# Patient Record
Sex: FEMALE | Race: WHITE | NOT HISPANIC OR LATINO | URBAN - METROPOLITAN AREA
[De-identification: names, ages, dates, MRNs, and addresses within clinical notes are randomized per-mention and may not be internally consistent; named-entity substitution may affect disease eponyms.]

---

## 2017-02-02 ENCOUNTER — COMMUNICATION - HEALTHEAST (OUTPATIENT)
Dept: HEALTH INFORMATION MANAGEMENT | Facility: CLINIC | Age: 37
End: 2017-02-02

## 2017-08-30 ENCOUNTER — AMBULATORY - HEALTHEAST (OUTPATIENT)
Dept: FAMILY MEDICINE | Facility: CLINIC | Age: 37
End: 2017-08-30

## 2017-08-30 ENCOUNTER — COMMUNICATION - HEALTHEAST (OUTPATIENT)
Dept: FAMILY MEDICINE | Facility: CLINIC | Age: 37
End: 2017-08-30

## 2017-08-30 DIAGNOSIS — J45.30 MILD PERSISTENT ASTHMA WITHOUT COMPLICATION: ICD-10-CM

## 2017-08-30 DIAGNOSIS — H00.011 HORDEOLUM EXTERNUM OF RIGHT UPPER EYELID: ICD-10-CM

## 2017-09-11 ENCOUNTER — RECORDS - HEALTHEAST (OUTPATIENT)
Dept: ADMINISTRATIVE | Facility: OTHER | Age: 37
End: 2017-09-11

## 2018-05-17 ENCOUNTER — OFFICE VISIT - HEALTHEAST (OUTPATIENT)
Dept: FAMILY MEDICINE | Facility: CLINIC | Age: 38
End: 2018-05-17

## 2018-05-17 DIAGNOSIS — Z00.00 ROUTINE GENERAL MEDICAL EXAMINATION AT A HEALTH CARE FACILITY: ICD-10-CM

## 2018-05-17 DIAGNOSIS — J45.40 MODERATE PERSISTENT ASTHMA WITHOUT COMPLICATION: ICD-10-CM

## 2018-05-17 DIAGNOSIS — Z30.431 IUD CHECK UP: ICD-10-CM

## 2018-05-17 ASSESSMENT — MIFFLIN-ST. JEOR: SCORE: 1545.4

## 2019-08-01 ENCOUNTER — OFFICE VISIT - HEALTHEAST (OUTPATIENT)
Dept: FAMILY MEDICINE | Facility: CLINIC | Age: 39
End: 2019-08-01

## 2019-08-01 DIAGNOSIS — J45.40 MODERATE PERSISTENT ASTHMA WITHOUT COMPLICATION: ICD-10-CM

## 2019-08-01 DIAGNOSIS — R53.83 FATIGUE, UNSPECIFIED TYPE: ICD-10-CM

## 2019-08-01 DIAGNOSIS — Z00.00 ROUTINE GENERAL MEDICAL EXAMINATION AT A HEALTH CARE FACILITY: ICD-10-CM

## 2019-08-01 DIAGNOSIS — Z13.220 LIPID SCREENING: ICD-10-CM

## 2019-08-01 LAB
ALBUMIN SERPL-MCNC: 4.3 G/DL (ref 3.5–5)
ALP SERPL-CCNC: 93 U/L (ref 45–120)
ALT SERPL W P-5'-P-CCNC: 33 U/L (ref 0–45)
ANION GAP SERPL CALCULATED.3IONS-SCNC: 12 MMOL/L (ref 5–18)
AST SERPL W P-5'-P-CCNC: 27 U/L (ref 0–40)
BASOPHILS # BLD AUTO: 0.1 THOU/UL (ref 0–0.2)
BASOPHILS NFR BLD AUTO: 1 % (ref 0–2)
BILIRUB SERPL-MCNC: 0.8 MG/DL (ref 0–1)
BUN SERPL-MCNC: 10 MG/DL (ref 8–22)
C REACTIVE PROTEIN LHE: 0.8 MG/DL (ref 0–0.8)
CALCIUM SERPL-MCNC: 9.9 MG/DL (ref 8.5–10.5)
CHLORIDE BLD-SCNC: 104 MMOL/L (ref 98–107)
CHOLEST SERPL-MCNC: 205 MG/DL
CO2 SERPL-SCNC: 25 MMOL/L (ref 22–31)
CREAT SERPL-MCNC: 0.76 MG/DL (ref 0.6–1.1)
EOSINOPHIL # BLD AUTO: 0.2 THOU/UL (ref 0–0.4)
EOSINOPHIL NFR BLD AUTO: 2 % (ref 0–6)
ERYTHROCYTE [DISTWIDTH] IN BLOOD BY AUTOMATED COUNT: 11.2 % (ref 11–14.5)
FASTING STATUS PATIENT QL REPORTED: NO
FERRITIN SERPL-MCNC: 49 NG/ML (ref 10–130)
GFR SERPL CREATININE-BSD FRML MDRD: >60 ML/MIN/1.73M2
GLUCOSE BLD-MCNC: 85 MG/DL (ref 70–125)
HCT VFR BLD AUTO: 45.3 % (ref 35–47)
HDLC SERPL-MCNC: 74 MG/DL
HGB BLD-MCNC: 15.1 G/DL (ref 12–16)
LDLC SERPL CALC-MCNC: 118 MG/DL
LYMPHOCYTES # BLD AUTO: 2 THOU/UL (ref 0.8–4.4)
LYMPHOCYTES NFR BLD AUTO: 28 % (ref 20–40)
MCH RBC QN AUTO: 31 PG (ref 27–34)
MCHC RBC AUTO-ENTMCNC: 33.3 G/DL (ref 32–36)
MCV RBC AUTO: 93 FL (ref 80–100)
MONOCYTES # BLD AUTO: 0.4 THOU/UL (ref 0–0.9)
MONOCYTES NFR BLD AUTO: 5 % (ref 2–10)
NEUTROPHILS # BLD AUTO: 4.5 THOU/UL (ref 2–7.7)
NEUTROPHILS NFR BLD AUTO: 64 % (ref 50–70)
PLATELET # BLD AUTO: 362 THOU/UL (ref 140–440)
PMV BLD AUTO: 6.9 FL (ref 7–10)
POTASSIUM BLD-SCNC: 4.5 MMOL/L (ref 3.5–5)
PROT SERPL-MCNC: 7.7 G/DL (ref 6–8)
RBC # BLD AUTO: 4.87 MILL/UL (ref 3.8–5.4)
SODIUM SERPL-SCNC: 141 MMOL/L (ref 136–145)
TRIGL SERPL-MCNC: 65 MG/DL
WBC: 7 THOU/UL (ref 4–11)

## 2019-08-01 ASSESSMENT — MIFFLIN-ST. JEOR: SCORE: 1560.03

## 2019-08-02 LAB
25(OH)D3 SERPL-MCNC: 31.1 NG/ML (ref 30–80)
25(OH)D3 SERPL-MCNC: 31.1 NG/ML (ref 30–80)
HPV SOURCE: NORMAL
HUMAN PAPILLOMA VIRUS 16 DNA: NEGATIVE
HUMAN PAPILLOMA VIRUS 18 DNA: NEGATIVE
HUMAN PAPILLOMA VIRUS FINAL DIAGNOSIS: NORMAL
HUMAN PAPILLOMA VIRUS OTHER HR: NEGATIVE
SPECIMEN DESCRIPTION: NORMAL

## 2019-10-08 ENCOUNTER — COMMUNICATION - HEALTHEAST (OUTPATIENT)
Dept: FAMILY MEDICINE | Facility: CLINIC | Age: 39
End: 2019-10-08

## 2021-01-29 ENCOUNTER — OFFICE VISIT - HEALTHEAST (OUTPATIENT)
Dept: FAMILY MEDICINE | Facility: CLINIC | Age: 41
End: 2021-01-29

## 2021-01-29 DIAGNOSIS — Z13.220 LIPID SCREENING: ICD-10-CM

## 2021-01-29 DIAGNOSIS — Z30.432 ENCOUNTER FOR IUD REMOVAL: ICD-10-CM

## 2021-01-29 DIAGNOSIS — R63.5 ABNORMAL WEIGHT GAIN: ICD-10-CM

## 2021-01-29 DIAGNOSIS — Z13.1 SCREENING FOR DIABETES MELLITUS: ICD-10-CM

## 2021-01-29 DIAGNOSIS — J45.40 MODERATE PERSISTENT ASTHMA WITHOUT COMPLICATION: ICD-10-CM

## 2021-01-29 DIAGNOSIS — Z00.00 ROUTINE GENERAL MEDICAL EXAMINATION AT A HEALTH CARE FACILITY: ICD-10-CM

## 2021-01-29 LAB
CHOLEST SERPL-MCNC: 201 MG/DL
FASTING STATUS PATIENT QL REPORTED: YES
FASTING STATUS PATIENT QL REPORTED: YES
GLUCOSE BLD-MCNC: 89 MG/DL (ref 70–99)
HDLC SERPL-MCNC: 76 MG/DL
LDLC SERPL CALC-MCNC: 109 MG/DL
TRIGL SERPL-MCNC: 79 MG/DL
TSH SERPL DL<=0.005 MIU/L-ACNC: 1.02 UIU/ML (ref 0.3–5)

## 2021-01-29 RX ORDER — ALBUTEROL SULFATE 90 UG/1
2 AEROSOL, METERED RESPIRATORY (INHALATION) EVERY 6 HOURS PRN
Qty: 8.5 G | Refills: 6 | Status: SHIPPED | OUTPATIENT
Start: 2021-01-29

## 2021-01-29 ASSESSMENT — MIFFLIN-ST. JEOR: SCORE: 1670.88

## 2021-05-28 ASSESSMENT — ASTHMA QUESTIONNAIRES: ACT_TOTALSCORE: 21

## 2021-05-31 NOTE — PROGRESS NOTES
"FEMALE ADULT PREVENTIVE EXAM    CHIEF COMPLAINT:  Female preventive exam.    SUBJECTIVE:  Yoly Casas is a 39 y.o. female who presents for her routine physical exam.    Patient would like to address the following concerns today:     1) Stress:  Job is stressful.  [employer] is restructuring.  Her  almost left the family for a coworker who was 17 years younger.  They have had to cut off ties with her 's mother as mother was exhibiting abusive behavior to their children telling them \" Your mother does not love you like I do.\"  Her  went through a deep depression and at one point he was suicidal.  He has now found a good counselor and working on medication.  Patient herself feels like she is handling things well.  Offered counseling more for support for her, since her  is currently unavailable to be in the supportive role.  Number given for [facility].    2) Asthma:  Not using the fluticasone really.  Using albuterol just once a month.  Cold air is her trigger, so she takes it in the winter.      3) Going to a naturopath for fatigue.  Has labs she would like drawn today.  Sleep is disturbed.  No problems falling asleep.  Wakes at 2-3 am and up for an hour.  Sometimes the cats wake her up.  She is just laying in the dark.  Tries to meditate.  Taking magnesium glycinate 400 mg a day.  Not really interested in medication.  Discussed how stress can cause fatigue, but also disturb sleep.    GYNE HISTORY  Menses: yes, monthly light for 1 week  Sexually Active: with   Contraception: 11/2016 - Mirena IUD  Last Pap: 2014 - normal and neg HPV  Abnormal Pap: none  Vaginal Discharge: none      She  has a past medical history of Allergic, Asthma, and Urinary tract infection.    Lab Results   Component Value Date    WBC 9.0 10/13/2016    HGB 14.3 10/13/2016    HCT 42.2 10/13/2016    MCV 89 10/13/2016     10/13/2016     11/14/2014    K 4.9 11/14/2014    BUN 9 11/14/2014 "     Lab Results   Component Value Date    CHOL 191 10/13/2016    HDL 63 10/13/2016    LDLCALC 110 10/13/2016    TRIG 88 10/13/2016     Lab Results   Component Value Date    TSH 1.16 10/13/2016     BP Readings from Last 3 Encounters:   08/01/19 120/80   05/17/18 112/70   11/09/16 108/80       Surgeries:  No past surgical history on file.    Family History:  Her family history includes Diabetes in her maternal grandfather and maternal grandmother; Heart disease in her maternal grandfather; Hypothyroidism in her maternal grandmother; No Medical Problems in her brother, brother, father, mother, sister, and son.    Social History:  She  reports that she has never smoked. She has never used smokeless tobacco. She reports that she does not drink alcohol or use drugs.    Medications:    Current Outpatient Medications:      albuterol (PROAIR HFA) 90 mcg/actuation inhaler, Inhale 2 puffs every 6 (six) hours as needed for wheezing., Disp: 8.5 g, Rfl: 6     fluticasone propionate (FLOVENT HFA) 110 mcg/actuation inhaler, Inhale 1 puff 2 (two) times a day., Disp: 3 Inhaler, Rfl: 1     levonorgestrel (MIRENA) 20 mcg/24 hours (5 yrs) 52 mg IUD, 1 each by Intrauterine route once., Disp: , Rfl:       Allergies:  No latex allergies.  No Known Allergies       RISK BEHAVIOR & HEALTH HABITS  Self Breast Exam: yes.  Regular Exercise: weightlifting, HIIT combo 4 days a week.  One day of yoga.  Balanced diet: yes, intermittent fasting - 8 hours of eating window.  Seat Belt Use: yes  Calcium intake/Osteoporosis prevention: Discussed Vitamin D.      Guns: NO  Sun Screen: YES  Dental Care: YES    REVIEW OF SYSTEMS:  Complete head to toe review of systems is otherwise negative except as above.    OBJECTIVE:  VITAL SIGNS:    Vitals:    08/01/19 1327   BP: 120/80   Pulse: 63   Resp: 16   Temp: 98.3  F (36.8  C)   SpO2: 98%     GENERAL:  Patient alert, in no acute distress.  EYES: PERRLA. Extraoccular movements intact, pupils equal, reactive to  light and accommodation.  Normal conjunctiva and lids.  Undilated fudoscopic exam normal, including normal size, appearance cup-to-disc ratio.  Normal posterior segments, including no exudates or hemorrhages.  ENT:  Hearing grossly normal.  Normal appearance to ears and nose.  Bilateral TM s, external canals, oropharynx normal. Normal lips, gums and teeth.  Normal nasal mucosa, septum and turbinates.  NECK:  Supple, without thyromegaly or mass.  RESP:  Clear to auscultation without crackles, wheezes or distress.  Normal respiratory effort.   CV:  Regular rate and rhythm without murmurs, rubs or gallops.  Normal carotid, abdominal aorta, femoral and pedal pulses.  No varicosities or edema.  ABDOMEN:  Soft, non-tender, without hepatosplenomegaly, masses, or hernias.   BREASTS:  Nontender, without masses, nipple discharge, erythema, or axillary adenopathy.    :    External genitalia:  Normal without lesions.  Urethra: Normal appearing  Vagina: Normal without discharge  Cervix: normal.  IUD string in place.  Uterus:  Nontender, mobile, without masses  Ovaries: Normal without masses  LYMPHATIC: Normal palpation of neck, groin and axilla..  No lymphadenopathy.  No bruising.  NEURO:  CN II-XII intact, motor & sensory function all intact.  DTR and reflexes normal.  PSYCHIATRIC:  Alert & oriented with normal mood and affect.  Good judgment and insight.  SKIN:  Normal inspection and palpation.  MUSCULOSKELETAL: Normal gait and station.  - Spine / Ribs / Pelvis: Normal inspection, ROM, stability and strength: Spine, Head, Neck, Upper and Lower Extremities.    ASSESSMENT & PLAN  Yoly was seen today for annual exam.    Diagnoses and all orders for this visit:    Routine general medical examination at a health care facility  -     Gynecologic Cytology (PAP Smear)  Travelled to [facility] and went to a travel clinic for vaccinations 2011.  She is quite sure that she received the hepatitis A and B series.  Reviewed family history  for high risk screening.    Moderate persistent asthma without complication  -     albuterol (PROAIR HFA) 90 mcg/actuation inhaler; Inhale 2 puffs every 6 (six) hours as needed for wheezing.  -     fluticasone propionate (FLOVENT HFA) 110 mcg/actuation inhaler; Inhale 1 puff 2 (two) times a day.  Well controlled.  Does not use the fluticasone except in the winter as cold air is her trigger.    Fatigue, unspecified type  -     HM1(CBC and Differential)  -     Comprehensive Metabolic Panel  -     C-Reactive Protein  -     Ferritin  -     HM1 (CBC with Diff)  -     Vitamin D, Total (25-Hydroxy)  Will check for organic causes, but her sleep and excessive amounts of stress can both contribute to this.  Recommend increased Vitamin D supplement to 3,000 - 4,000 daily.  Discussed melatonin 3 mg at bedtime.  Recommend lavendar essential oil.  Discussed the possibility of trazodone for night time, but patient declines for now.    Lipid screening  -     Lipid [place] RANDOM

## 2021-06-01 VITALS — BODY MASS INDEX: 32.36 KG/M2 | HEIGHT: 65 IN | WEIGHT: 194.25 LBS

## 2021-06-03 VITALS — HEIGHT: 65 IN | BODY MASS INDEX: 32.99 KG/M2 | WEIGHT: 198 LBS

## 2021-06-05 VITALS
OXYGEN SATURATION: 98 % | DIASTOLIC BLOOD PRESSURE: 90 MMHG | HEIGHT: 65 IN | BODY MASS INDEX: 36.91 KG/M2 | TEMPERATURE: 98.4 F | HEART RATE: 85 BPM | SYSTOLIC BLOOD PRESSURE: 120 MMHG | WEIGHT: 221.56 LBS

## 2021-06-14 NOTE — PROGRESS NOTES
FEMALE ADULT PREVENTIVE EXAM    CHIEF COMPLAINT:  Female preventive exam.    SUBJECTIVE:  Yoly Casas is a 40 y.o. female who presents for her routine physical exam.    Patient would like to address the following concerns today:     Working from home.  Work is stressful due to changes.  Marriage dynamic has improved since cutting ties with family.  Moved into a bigger house and has more space.  Dilshad was furloughed for four months, but cutting hours.  Large yard.  Kids are in school  Georgette is in .  Rosario was remote, but went back full time 2 weeks ago.        Would like IUD removed:  Difficulties with losing weight.  Having break through spotting anyway.  Looking at effects on mood.    Working out 5 days a week.  Restrictive diet x 1.5 years.      GYNE HISTORY  Menses: spotting for 3-4 days every 3-4 weeks.  Getting cramps again.    Sexually Active: with .  Contraception: Mirena  Last Pap: 2019  Abnormal Pap: no  Vaginal Discharge: little bit.      She  has a past medical history of Allergic, Asthma, and Urinary tract infection.    Lab Results   Component Value Date    WBC 7.0 08/01/2019    HGB 15.1 08/01/2019    HCT 45.3 08/01/2019    MCV 93 08/01/2019     08/01/2019     08/01/2019    K 4.5 08/01/2019    BUN 10 08/01/2019     Lab Results   Component Value Date    CHOL 201 (H) 01/29/2021    HDL 76 01/29/2021    LDLCALC 109 01/29/2021    TRIG 79 01/29/2021     Lab Results   Component Value Date    TSH 1.02 01/29/2021     BP Readings from Last 3 Encounters:   01/29/21 120/90   08/01/19 120/80   05/17/18 112/70       Surgeries:  No past surgical history on file.    Family History:  Her family history includes Diabetes in her maternal grandfather and maternal grandmother; Heart disease in her maternal grandfather; Hypothyroidism in her maternal grandmother; No Medical Problems in her brother, brother, father, mother, sister, and son.    Social History:  She  reports that she has never  smoked. She has never used smokeless tobacco. She reports that she does not drink alcohol or use drugs.    Medications:    Current Outpatient Medications:      albuterol (PROAIR HFA) 90 mcg/actuation inhaler, Inhale 2 puffs every 6 (six) hours as needed for wheezing., Disp: 8.5 g, Rfl: 6     fluticasone propionate (FLOVENT HFA) 110 mcg/actuation inhaler, Inhale 1 puff 2 (two) times a day., Disp: 3 Inhaler, Rfl: 1     levonorgestrel (MIRENA) 20 mcg/24 hours (5 yrs) 52 mg IUD, 1 each by Intrauterine route once., Disp: , Rfl:   HELD MEDICATIONS: None.    Allergies:  No latex allergies.  No Known Allergies       RISK BEHAVIOR & HEALTH HABITS  Self Breast Exam: yes  Regular Exercise: yoga, working out 5 days a week.  X 1.5 years.    Balanced diet: [place].  9128-3280 calories.   2 months no changes.    Seat Belt Use: yes  Calcium intake/Osteoporosis prevention: yes with MVI  Dexa: not due  Colonoscopy: not due  Mammogram: not due    Guns: NO  Sun Screen: YES  Dental Care: YES    REVIEW OF SYSTEMS:  Complete head to toe review of systems is otherwise negative except as above.    OBJECTIVE:  VITAL SIGNS:    Vitals:    01/29/21 0830   BP: 120/90   Pulse: 85   Temp: 98.4  F (36.9  C)   SpO2: 98%     GENERAL:  Patient alert, in no acute distress.  EYES: PERRLA. Extraoccular movements intact, pupils equal, reactive to light and accommodation.  Normal conjunctiva and lids.  Undilated fudoscopic exam normal, including normal size, appearance cup-to-disc ratio.  Normal posterior segments, including no exudates or hemorrhages.  ENT:  Hearing grossly normal.  Normal appearance to ears and nose.  Bilateral TM s, external canals, oropharynx normal. Normal lips, gums and teeth.  Normal nasal mucosa, septum and turbinates.  NECK:  Supple, without thyromegaly or mass.  RESP:  Clear to auscultation without crackles, wheezes or distress.  Normal respiratory effort.   CV:  Regular rate and rhythm without murmurs, rubs or gallops.  Normal  carotid, abdominal aorta, femoral and pedal pulses.  No varicosities or edema.  ABDOMEN:  Soft, non-tender, without hepatosplenomegaly, masses, or hernias.   BREASTS:  Nontender, without masses, nipple discharge, erythema, or axillary adenopathy.    :    External genitalia:  Normal without lesions.  Urethra: Normal appearing  Vagina: Normal without discharge  Cervix: normal  Uterus:  Nontender, mobile, without masses  Ovaries: Normal without masses  LYMPHATIC: Normal palpation of neck, groin and axilla..  No lymphadenopathy.  No bruising.  NEURO:  CN II-XII intact, motor & sensory function all intact.  DTR and reflexes normal.  PSYCHIATRIC:  Alert & oriented with normal mood and affect.  Good judgment and insight.  SKIN:  Normal inspection and palpation.  MUSCULOSKELETAL: Normal gait and station.  - Spine / Ribs / Pelvis: Normal inspection, ROM, stability and strength: Spine, Head, Neck, Upper and Lower Extremities.    ASSESSMENT & PLAN  Yoly was seen today for annual exam.    Diagnoses and all orders for this visit:    Routine general medical examination at a health care facility  Patient is leading a very healthy lifestyle  Reviewed family history for high risk screening.  Vaccines are up to date.    Abnormal weight gain  -     Thyroid [place]  Patient has had a lot of difficulty with inability to lose weight and she has been very healthy with her exercise and clean eating habits.  She is feeling stronger, but very frustrated by no change in weight or clothing fit.    Lipid screening  -     Lipid [place] FASTING    Screening for diabetes mellitus  -     Glucose    Moderate persistent asthma without complication  -     albuterol (PROAIR HFA) 90 mcg/actuation inhaler; Inhale 2 puffs every 6 (six) hours as needed for wheezing.  Well controlled.  In the past 4 weeks, how much of the time did your asthma keep you from getting as much done at work, school, or at home?: None of the time  During the past 4 weeks, how  often have you had shortness of breath?: Once or twice a week  During the past 4 weeks, how often did your asthma symptoms (wheezing, coughing, shortness of breath, chest tightness or pain) wake you up at night or earlier in the morning?: Not at all  During the past 4 weeks, how often have you used your rescue inhaler or nebulizer medication (such as albuterol)?: 2 or 3 times per week  How would you rate your asthma control during the past 4 weeks?: Well controlled  ACT Total Score: 21  In the past 12 months, have you visited the emergency room due to your asthma?: No  In the past 12 months, have you been hospitalized due to your asthma?: No      Encounter for IUD removal  IUD Removal:  Due to difficulties with weight gain    Discussed risks and benefits of procedure.  Patient gave verbal consent to proceed.  Patient was placed in the dorsolithotomy position.  Speculum was inserted and cervix was well visualized.  IUD string grasped with ring forceps and removed without complication.  Patient tolerated the procedure well.    Complications: none.  EBL: none.    Discussed ibuprofen for pain.  Discussed signs and symptoms for which to notify the clinic.

## 2021-06-18 NOTE — PROGRESS NOTES
FEMALE ADULT PREVENTIVE EXAM    CHIEF COMPLAINT:  Female preventive exam.    SUBJECTIVE:  Yoly Casas is a 37 y.o. female who presents for her routine physical exam.    Patient would like to address the following concerns today:     1) moderate persistent asthma:  Insurance did not cover asmanex.  Triggers: damp cold, cats, hot/humidity, ragweed, cold.  Has been out of asmanex for 4 weeks and taking inhaler weekly.  ACT today.  Has not had any other maintenance inhaler.  Asmanex 1 puff BID.  Will change to fluticasone HFA.  ACT = 22    2) Mirena Insertion: November.  Needs recheck today.  No discomfort, irregular spotting and bleeding.    GYNE HISTORY  Menses: intermittent spotting or full period   Sexually Active: with   Contraception: Mirena 11/2017  Last Pap: normal and neg HPV  2014  Abnormal Pap: none  Vaginal Discharge: no      She  has a past medical history of Allergic; Asthma; and Urinary tract infection.    Lab Results   Component Value Date    WBC 9.0 10/13/2016    HGB 14.3 10/13/2016    HCT 42.2 10/13/2016    MCV 89 10/13/2016     10/13/2016     11/14/2014    K 4.9 11/14/2014    BUN 9 11/14/2014     Lab Results   Component Value Date    CHOL 191 10/13/2016    HDL 63 10/13/2016    LDLCALC 110 10/13/2016    TRIG 88 10/13/2016     Lab Results   Component Value Date    TSH 1.16 10/13/2016     BP Readings from Last 3 Encounters:   05/17/18 112/70   11/09/16 108/80   10/13/16 110/82       Surgeries:  No past surgical history on file.    Family History:  Her family history includes Diabetes in her maternal grandfather and maternal grandmother; Heart disease in her maternal grandfather; Hypothyroidism in her maternal grandmother; No Medical Problems in her brother, brother, father, mother, sister, and son. There is no history of Breast cancer, Colon cancer, or Clotting disorder.    Social History:  She  reports that she has never smoked. She has never used smokeless tobacco. She reports  that she does not drink alcohol or use illicit drugs.    Medications:    Current Outpatient Prescriptions:      albuterol (PROAIR HFA) 90 mcg/actuation inhaler, Inhale 2 puffs every 6 (six) hours as needed for wheezing., Disp: 8.5 g, Rfl: 6     fluticasone (FLOVENT HFA) 110 mcg/actuation inhaler, Inhale 1 puff 2 (two) times a day., Disp: 3 Inhaler, Rfl: 1      Allergies:  No latex allergies.  No Known Allergies         RISK BEHAVIOR & HEALTH HABITS  Self Breast Exam: yes.  Regular Exercise: Doing P90X and Insanity videos.  For stretching PiYo once a week.  4 week/a week.  Doing 30 minutes a week.    Balanced diet: mediterranean/keto type diet.  Seat Belt Use: yes  Calcium intake/Osteoporosis prevention: through diet.     Guns: NO  Sun Screen: YES  Dental Care: YES    REVIEW OF SYSTEMS:  Complete head to toe review of systems is otherwise negative except as above.    OBJECTIVE:  VITAL SIGNS:    Vitals:    05/17/18 1119   BP: 112/70   Pulse: 87   SpO2: 98%     GENERAL:  Patient alert, in no acute distress.  EYES: PERRLA. Extraoccular movements intact, pupils equal, reactive to light and accommodation.  Normal conjunctiva and lids.  Undilated fudoscopic exam normal, including normal size, appearance cup-to-disc ratio.  Normal posterior segments, including no exudates or hemorrhages.  ENT:  Hearing grossly normal.  Normal appearance to ears and nose.  Bilateral TM s, external canals, oropharynx normal. Normal lips, gums and teeth.  Normal nasal mucosa, septum and turbinates.  NECK:  Supple, without thyromegaly or mass.  RESP:  Clear to auscultation without crackles, wheezes or distress.  Normal respiratory effort.   CV:  Regular rate and rhythm without murmurs, rubs or gallops.  Normal carotid, abdominal aorta, femoral and pedal pulses.  No varicosities or edema.  ABDOMEN:  Soft, non-tender, without hepatosplenomegaly, masses, or hernias.   BREASTS:  Nontender, without masses, nipple discharge, erythema, or axillary  adenopathy.    :    External genitalia:  Normal without lesions.  Urethra: Normal appearing  Vagina: Normal without discharge  Cervix: IUD string in place 2.5 cm out of the cervical os.  Uterus:  Nontender, mobile, without masses  Ovaries: Normal without masses  LYMPHATIC: Normal palpation of neck, groin and axilla..  No lymphadenopathy.  No bruising.  NEURO:  CN II-XII intact, motor & sensory function all intact.  DTR and reflexes normal.  PSYCHIATRIC:  Alert & oriented with normal mood and affect.  Good judgment and insight.  SKIN:  Normal inspection and palpation.  MUSCULOSKELETAL: Normal gait and station.  - Spine / Ribs / Pelvis: Normal inspection, ROM, stability and strength: Spine, Head, Neck, Upper and Lower Extremities.    ASSESSMENT & PLAN  Yoly was seen today for annual exam and medication management.    Diagnoses and all orders for this visit:    Routine general medical examination at a health care facility  Patient has had a stressful year with her marriage, but is managing well today.  She is leading a very healthy lifestyle.  Reviewed family history for high risk screening.    Moderate persistent asthma without complication  -     fluticasone (FLOVENT HFA) 110 mcg/actuation inhaler; Inhale 1 puff 2 (two) times a day.  -     albuterol (PROAIR HFA) 90 mcg/actuation inhaler; Inhale 2 puffs every 6 (six) hours as needed for wheezing.  Need to change from asmanex due to insurance coverage.  She has never been on another inhaler.  Will start with fluticasone.  Discussed use, potential side effects, and reminded her to rinse mouth after each use.  Discussed follow up appointment in 2-3 months for spirometry.  ACT today is 22 even off of maintenance inhaler.    IUD check up  IUD appears in place.  She still has some intermittent spotting, but otherwise, no difficulty.

## 2021-10-16 ENCOUNTER — HEALTH MAINTENANCE LETTER (OUTPATIENT)
Age: 41
End: 2021-10-16

## 2022-04-02 ENCOUNTER — HEALTH MAINTENANCE LETTER (OUTPATIENT)
Age: 42
End: 2022-04-02

## 2022-10-01 ENCOUNTER — HEALTH MAINTENANCE LETTER (OUTPATIENT)
Age: 42
End: 2022-10-01

## 2023-05-13 ENCOUNTER — HEALTH MAINTENANCE LETTER (OUTPATIENT)
Age: 43
End: 2023-05-13

## 2024-03-03 ENCOUNTER — HEALTH MAINTENANCE LETTER (OUTPATIENT)
Age: 44
End: 2024-03-03

## 2024-07-21 ENCOUNTER — HEALTH MAINTENANCE LETTER (OUTPATIENT)
Age: 44
End: 2024-07-21

## 2025-08-10 ENCOUNTER — HEALTH MAINTENANCE LETTER (OUTPATIENT)
Age: 45
End: 2025-08-10